# Patient Record
Sex: MALE | Race: BLACK OR AFRICAN AMERICAN | NOT HISPANIC OR LATINO | URBAN - METROPOLITAN AREA
[De-identification: names, ages, dates, MRNs, and addresses within clinical notes are randomized per-mention and may not be internally consistent; named-entity substitution may affect disease eponyms.]

---

## 2021-10-27 RX ORDER — POVIDONE-IODINE 5 %
1 AEROSOL (ML) TOPICAL ONCE
Refills: 0 | Status: COMPLETED | OUTPATIENT
Start: 2021-10-28 | End: 2021-10-28

## 2021-10-27 NOTE — ASU PATIENT PROFILE, ADULT - NSICDXPASTSURGICALHX_GEN_ALL_CORE_FT
PAST SURGICAL HISTORY:  No significant past surgical history PAST SURGICAL HISTORY:  S/P appendectomy
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
## 2021-10-27 NOTE — H&P ADULT - NSHPLABSRESULTS_GEN_ALL_CORE
Preop CBC, BMP, PT/PTT/INR, UA - WNL per medical clearance  Cr .97  Spirometry WNL  Preop CXR - WNL per medical clearance   Preop EKG - sinus - WNL per medical clearance   3M DOS  Covid swab _____ Preop CBC, BMP, PT/PTT/INR, UA - WNL per medical clearance  Cr 0.97  Spirometry WNL  Preop CXR - WNL per medical clearance   Preop EKG - sinus - WNL per medical clearance   3M DOS  Covid swab neg 10/26

## 2021-10-27 NOTE — H&P ADULT - PROBLEM SELECTOR PLAN 1
Admit to Orthopaedic Service.  Presents today for elective anterior cervical discectomy with fusion C6-C7, possible C5-C6  Pt medically stable and cleared for procedure today by Dr. Enamorado

## 2021-10-27 NOTE — H&P ADULT - NSHPPHYSICALEXAM_GEN_ALL_CORE
MSK: + decreased ROM 2/2 pain, cervical spine       Remainder of exam per medical clearance note NAD, sitting comfortably in chair  MSK: Decreased ROM of cervical spine secondary to pain, sensation intact to light touch in bilateral upper extremities, radial pulse 2+ bilaterally, 4/5  BUE, 4/5 triceps LUE, 5/5 biceps/triceps RUE, 5/5 biceps LUE    Rest of PE per medical clearance

## 2021-10-28 ENCOUNTER — INPATIENT (INPATIENT)
Facility: HOSPITAL | Age: 46
LOS: 2 days | Discharge: HOME | DRG: 473 | End: 2021-10-31
Attending: ORTHOPAEDIC SURGERY | Admitting: ORTHOPAEDIC SURGERY
Payer: COMMERCIAL

## 2021-10-28 VITALS
TEMPERATURE: 97 F | HEIGHT: 69 IN | WEIGHT: 182.98 LBS | HEART RATE: 92 BPM | SYSTOLIC BLOOD PRESSURE: 119 MMHG | DIASTOLIC BLOOD PRESSURE: 79 MMHG | RESPIRATION RATE: 16 BRPM | OXYGEN SATURATION: 96 %

## 2021-10-28 DIAGNOSIS — M50.122 CERVICAL DISC DISORDER AT C5-C6 LEVEL WITH RADICULOPATHY: ICD-10-CM

## 2021-10-28 DIAGNOSIS — Z90.49 ACQUIRED ABSENCE OF OTHER SPECIFIED PARTS OF DIGESTIVE TRACT: Chronic | ICD-10-CM

## 2021-10-28 DIAGNOSIS — M54.12 RADICULOPATHY, CERVICAL REGION: ICD-10-CM

## 2021-10-28 DIAGNOSIS — M48.02 SPINAL STENOSIS, CERVICAL REGION: ICD-10-CM

## 2021-10-28 RX ORDER — GABAPENTIN 400 MG/1
300 CAPSULE ORAL ONCE
Refills: 0 | Status: COMPLETED | OUTPATIENT
Start: 2021-10-28 | End: 2021-10-28

## 2021-10-28 RX ORDER — INFLUENZA VIRUS VACCINE 15; 15; 15; 15 UG/.5ML; UG/.5ML; UG/.5ML; UG/.5ML
0.5 SUSPENSION INTRAMUSCULAR ONCE
Refills: 0 | Status: DISCONTINUED | OUTPATIENT
Start: 2021-10-28 | End: 2021-10-31

## 2021-10-28 RX ORDER — BENZOCAINE AND MENTHOL 5; 1 G/100ML; G/100ML
1 LIQUID ORAL
Refills: 0 | Status: DISCONTINUED | OUTPATIENT
Start: 2021-10-28 | End: 2021-10-28

## 2021-10-28 RX ORDER — ONDANSETRON 8 MG/1
4 TABLET, FILM COATED ORAL EVERY 6 HOURS
Refills: 0 | Status: DISCONTINUED | OUTPATIENT
Start: 2021-10-28 | End: 2021-10-31

## 2021-10-28 RX ORDER — BENZOCAINE AND MENTHOL 5; 1 G/100ML; G/100ML
1 LIQUID ORAL
Refills: 0 | Status: DISCONTINUED | OUTPATIENT
Start: 2021-10-28 | End: 2021-10-31

## 2021-10-28 RX ORDER — HYDROMORPHONE HYDROCHLORIDE 2 MG/ML
0.5 INJECTION INTRAMUSCULAR; INTRAVENOUS; SUBCUTANEOUS
Refills: 0 | Status: DISCONTINUED | OUTPATIENT
Start: 2021-10-28 | End: 2021-10-31

## 2021-10-28 RX ORDER — CEFAZOLIN SODIUM 1 G
2000 VIAL (EA) INJECTION EVERY 8 HOURS
Refills: 0 | Status: COMPLETED | OUTPATIENT
Start: 2021-10-28 | End: 2021-10-29

## 2021-10-28 RX ORDER — METOCLOPRAMIDE HCL 10 MG
10 TABLET ORAL ONCE
Refills: 0 | Status: DISCONTINUED | OUTPATIENT
Start: 2021-10-28 | End: 2021-10-31

## 2021-10-28 RX ORDER — CHLORHEXIDINE GLUCONATE 213 G/1000ML
1 SOLUTION TOPICAL ONCE
Refills: 0 | Status: COMPLETED | OUTPATIENT
Start: 2021-10-28 | End: 2021-10-28

## 2021-10-28 RX ORDER — ACETAMINOPHEN 500 MG
1000 TABLET ORAL ONCE
Refills: 0 | Status: COMPLETED | OUTPATIENT
Start: 2021-10-28 | End: 2021-10-28

## 2021-10-28 RX ORDER — OXYCODONE HYDROCHLORIDE 5 MG/1
5 TABLET ORAL EVERY 4 HOURS
Refills: 0 | Status: DISCONTINUED | OUTPATIENT
Start: 2021-10-28 | End: 2021-10-29

## 2021-10-28 RX ORDER — OXYCODONE HYDROCHLORIDE 5 MG/1
10 TABLET ORAL EVERY 4 HOURS
Refills: 0 | Status: DISCONTINUED | OUTPATIENT
Start: 2021-10-28 | End: 2021-10-29

## 2021-10-28 RX ORDER — APREPITANT 80 MG/1
40 CAPSULE ORAL ONCE
Refills: 0 | Status: COMPLETED | OUTPATIENT
Start: 2021-10-28 | End: 2021-10-28

## 2021-10-28 RX ORDER — SODIUM CHLORIDE 9 MG/ML
1000 INJECTION, SOLUTION INTRAVENOUS
Refills: 0 | Status: DISCONTINUED | OUTPATIENT
Start: 2021-10-28 | End: 2021-10-31

## 2021-10-28 RX ORDER — DEXAMETHASONE 0.5 MG/5ML
10 ELIXIR ORAL EVERY 8 HOURS
Refills: 0 | Status: COMPLETED | OUTPATIENT
Start: 2021-10-28 | End: 2021-10-29

## 2021-10-28 RX ORDER — MAGNESIUM HYDROXIDE 400 MG/1
30 TABLET, CHEWABLE ORAL EVERY 12 HOURS
Refills: 0 | Status: DISCONTINUED | OUTPATIENT
Start: 2021-10-28 | End: 2021-10-31

## 2021-10-28 RX ORDER — ACETAMINOPHEN 500 MG
975 TABLET ORAL EVERY 8 HOURS
Refills: 0 | Status: DISCONTINUED | OUTPATIENT
Start: 2021-10-28 | End: 2021-10-31

## 2021-10-28 RX ORDER — SENNA PLUS 8.6 MG/1
2 TABLET ORAL AT BEDTIME
Refills: 0 | Status: DISCONTINUED | OUTPATIENT
Start: 2021-10-28 | End: 2021-10-31

## 2021-10-28 RX ORDER — CYCLOBENZAPRINE HYDROCHLORIDE 10 MG/1
5 TABLET, FILM COATED ORAL THREE TIMES A DAY
Refills: 0 | Status: DISCONTINUED | OUTPATIENT
Start: 2021-10-28 | End: 2021-10-31

## 2021-10-28 RX ADMIN — Medication 1000 MILLIGRAM(S): at 09:04

## 2021-10-28 RX ADMIN — Medication 1 APPLICATION(S): at 08:49

## 2021-10-28 RX ADMIN — CYCLOBENZAPRINE HYDROCHLORIDE 5 MILLIGRAM(S): 10 TABLET, FILM COATED ORAL at 18:59

## 2021-10-28 RX ADMIN — Medication 102 MILLIGRAM(S): at 17:21

## 2021-10-28 RX ADMIN — Medication 975 MILLIGRAM(S): at 17:20

## 2021-10-28 RX ADMIN — Medication 975 MILLIGRAM(S): at 18:00

## 2021-10-28 RX ADMIN — GABAPENTIN 300 MILLIGRAM(S): 400 CAPSULE ORAL at 08:51

## 2021-10-28 RX ADMIN — Medication 1000 MILLIGRAM(S): at 08:51

## 2021-10-28 RX ADMIN — OXYCODONE HYDROCHLORIDE 10 MILLIGRAM(S): 5 TABLET ORAL at 23:30

## 2021-10-28 RX ADMIN — OXYCODONE HYDROCHLORIDE 10 MILLIGRAM(S): 5 TABLET ORAL at 22:33

## 2021-10-28 RX ADMIN — BENZOCAINE AND MENTHOL 1 LOZENGE: 5; 1 LIQUID ORAL at 22:33

## 2021-10-28 RX ADMIN — APREPITANT 40 MILLIGRAM(S): 80 CAPSULE ORAL at 08:51

## 2021-10-28 RX ADMIN — Medication 100 MILLIGRAM(S): at 18:59

## 2021-10-28 RX ADMIN — CHLORHEXIDINE GLUCONATE 1 APPLICATION(S): 213 SOLUTION TOPICAL at 08:49
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
## 2021-10-28 NOTE — CONSULT NOTE ADULT - ATTENDING COMMENTS
ASSESSMENT: This is a 46y old Male with no PMH scheduled for ACDF C6-7 possible C5-7 with Dr. Cerrato.     Recommended Treatment PLAN:  1. Oxycodone 5-10 mg PO Q4h PRN moderate-severe pain  2. Dilaudid 0.5 mg IVP Q2h PRN breakthrough pain  3. Tylenol 975 mg PO TID  4. Flexeril 5 mg PO TID  Pt seen and examined by me, NP acted as scribe
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
## 2021-10-29 LAB
ANION GAP SERPL CALC-SCNC: 11 MMOL/L — SIGNIFICANT CHANGE UP (ref 5–17)
ANISOCYTOSIS BLD QL: SLIGHT — SIGNIFICANT CHANGE UP
BASOPHILS # BLD AUTO: 0 K/UL — SIGNIFICANT CHANGE UP (ref 0–0.2)
BASOPHILS NFR BLD AUTO: 0 % — SIGNIFICANT CHANGE UP (ref 0–2)
BUN SERPL-MCNC: 18 MG/DL — SIGNIFICANT CHANGE UP (ref 7–23)
CALCIUM SERPL-MCNC: 9.2 MG/DL — SIGNIFICANT CHANGE UP (ref 8.4–10.5)
CHLORIDE SERPL-SCNC: 104 MMOL/L — SIGNIFICANT CHANGE UP (ref 96–108)
CO2 SERPL-SCNC: 22 MMOL/L — SIGNIFICANT CHANGE UP (ref 22–31)
CREAT SERPL-MCNC: 0.81 MG/DL — SIGNIFICANT CHANGE UP (ref 0.5–1.3)
EOSINOPHIL # BLD AUTO: 0 K/UL — SIGNIFICANT CHANGE UP (ref 0–0.5)
EOSINOPHIL NFR BLD AUTO: 0 % — SIGNIFICANT CHANGE UP (ref 0–6)
GIANT PLATELETS BLD QL SMEAR: PRESENT — SIGNIFICANT CHANGE UP
GLUCOSE SERPL-MCNC: 165 MG/DL — HIGH (ref 70–99)
HCT VFR BLD CALC: 43.6 % — SIGNIFICANT CHANGE UP (ref 39–50)
HGB BLD-MCNC: 14 G/DL — SIGNIFICANT CHANGE UP (ref 13–17)
LYMPHOCYTES # BLD AUTO: 1.16 K/UL — SIGNIFICANT CHANGE UP (ref 1–3.3)
LYMPHOCYTES # BLD AUTO: 6.2 % — LOW (ref 13–44)
MACROCYTES BLD QL: SLIGHT — SIGNIFICANT CHANGE UP
MANUAL SMEAR VERIFICATION: SIGNIFICANT CHANGE UP
MCHC RBC-ENTMCNC: 27.7 PG — SIGNIFICANT CHANGE UP (ref 27–34)
MCHC RBC-ENTMCNC: 32.1 GM/DL — SIGNIFICANT CHANGE UP (ref 32–36)
MCV RBC AUTO: 86.3 FL — SIGNIFICANT CHANGE UP (ref 80–100)
MICROCYTES BLD QL: SLIGHT — SIGNIFICANT CHANGE UP
MONOCYTES # BLD AUTO: 0.17 K/UL — SIGNIFICANT CHANGE UP (ref 0–0.9)
MONOCYTES NFR BLD AUTO: 0.9 % — LOW (ref 2–14)
NEUTROPHILS # BLD AUTO: 17.38 K/UL — HIGH (ref 1.8–7.4)
NEUTROPHILS NFR BLD AUTO: 92.9 % — HIGH (ref 43–77)
OVALOCYTES BLD QL SMEAR: SLIGHT — SIGNIFICANT CHANGE UP
PLAT MORPH BLD: ABNORMAL
PLATELET # BLD AUTO: 275 K/UL — SIGNIFICANT CHANGE UP (ref 150–400)
POIKILOCYTOSIS BLD QL AUTO: SIGNIFICANT CHANGE UP
POLYCHROMASIA BLD QL SMEAR: SLIGHT — SIGNIFICANT CHANGE UP
POTASSIUM SERPL-MCNC: 4.4 MMOL/L — SIGNIFICANT CHANGE UP (ref 3.5–5.3)
POTASSIUM SERPL-SCNC: 4.4 MMOL/L — SIGNIFICANT CHANGE UP (ref 3.5–5.3)
RBC # BLD: 5.05 M/UL — SIGNIFICANT CHANGE UP (ref 4.2–5.8)
RBC # FLD: 13.6 % — SIGNIFICANT CHANGE UP (ref 10.3–14.5)
RBC BLD AUTO: ABNORMAL
ROULEAUX BLD QL SMEAR: PRESENT
SODIUM SERPL-SCNC: 137 MMOL/L — SIGNIFICANT CHANGE UP (ref 135–145)
SPHEROCYTES BLD QL SMEAR: SLIGHT — SIGNIFICANT CHANGE UP
WBC # BLD: 18.71 K/UL — HIGH (ref 3.8–10.5)
WBC # FLD AUTO: 18.71 K/UL — HIGH (ref 3.8–10.5)

## 2021-10-29 PROCEDURE — 72040 X-RAY EXAM NECK SPINE 2-3 VW: CPT | Mod: 26

## 2021-10-29 RX ORDER — OXYCODONE HYDROCHLORIDE 5 MG/1
10 TABLET ORAL
Refills: 0 | Status: DISCONTINUED | OUTPATIENT
Start: 2021-10-29 | End: 2021-10-31

## 2021-10-29 RX ORDER — ACETAMINOPHEN 500 MG
3 TABLET ORAL
Qty: 0 | Refills: 0 | DISCHARGE
Start: 2021-10-29

## 2021-10-29 RX ORDER — CYCLOBENZAPRINE HYDROCHLORIDE 10 MG/1
1 TABLET, FILM COATED ORAL
Qty: 21 | Refills: 0
Start: 2021-10-29 | End: 2021-11-04

## 2021-10-29 RX ORDER — OXYCODONE HYDROCHLORIDE 5 MG/1
5 TABLET ORAL
Refills: 0 | Status: DISCONTINUED | OUTPATIENT
Start: 2021-10-29 | End: 2021-10-31

## 2021-10-29 RX ORDER — OXYCODONE HYDROCHLORIDE 5 MG/1
1 TABLET ORAL
Qty: 40 | Refills: 0
Start: 2021-10-29 | End: 2021-11-04

## 2021-10-29 RX ADMIN — Medication 975 MILLIGRAM(S): at 22:00

## 2021-10-29 RX ADMIN — SODIUM CHLORIDE 100 MILLILITER(S): 9 INJECTION, SOLUTION INTRAVENOUS at 17:00

## 2021-10-29 RX ADMIN — BENZOCAINE AND MENTHOL 1 LOZENGE: 5; 1 LIQUID ORAL at 12:34

## 2021-10-29 RX ADMIN — Medication 975 MILLIGRAM(S): at 14:10

## 2021-10-29 RX ADMIN — Medication 975 MILLIGRAM(S): at 09:26

## 2021-10-29 RX ADMIN — Medication 100 MILLIGRAM(S): at 02:03

## 2021-10-29 RX ADMIN — SODIUM CHLORIDE 100 MILLILITER(S): 9 INJECTION, SOLUTION INTRAVENOUS at 06:11

## 2021-10-29 RX ADMIN — CYCLOBENZAPRINE HYDROCHLORIDE 5 MILLIGRAM(S): 10 TABLET, FILM COATED ORAL at 06:11

## 2021-10-29 RX ADMIN — CYCLOBENZAPRINE HYDROCHLORIDE 5 MILLIGRAM(S): 10 TABLET, FILM COATED ORAL at 18:38

## 2021-10-29 RX ADMIN — Medication 975 MILLIGRAM(S): at 15:10

## 2021-10-29 RX ADMIN — OXYCODONE HYDROCHLORIDE 10 MILLIGRAM(S): 5 TABLET ORAL at 17:01

## 2021-10-29 RX ADMIN — Medication 975 MILLIGRAM(S): at 10:26

## 2021-10-29 RX ADMIN — OXYCODONE HYDROCHLORIDE 10 MILLIGRAM(S): 5 TABLET ORAL at 04:54

## 2021-10-29 RX ADMIN — Medication 102 MILLIGRAM(S): at 01:48

## 2021-10-29 RX ADMIN — SENNA PLUS 2 TABLET(S): 8.6 TABLET ORAL at 21:14

## 2021-10-29 RX ADMIN — OXYCODONE HYDROCHLORIDE 10 MILLIGRAM(S): 5 TABLET ORAL at 05:40

## 2021-10-29 RX ADMIN — OXYCODONE HYDROCHLORIDE 10 MILLIGRAM(S): 5 TABLET ORAL at 11:49

## 2021-10-29 RX ADMIN — Medication 102 MILLIGRAM(S): at 09:25

## 2021-10-29 RX ADMIN — BENZOCAINE AND MENTHOL 1 LOZENGE: 5; 1 LIQUID ORAL at 04:54

## 2021-10-29 RX ADMIN — Medication 975 MILLIGRAM(S): at 21:13

## 2021-10-29 RX ADMIN — OXYCODONE HYDROCHLORIDE 10 MILLIGRAM(S): 5 TABLET ORAL at 18:00

## 2021-10-29 RX ADMIN — OXYCODONE HYDROCHLORIDE 10 MILLIGRAM(S): 5 TABLET ORAL at 12:49

## 2021-10-29 NOTE — DISCHARGE NOTE PROVIDER - CARE PROVIDER_API CALL
Otoniel Cerrato (DO)  Orthopaedic Surgery  [address] [place], [place] [address]  Phone: [phone]  Fax: [phone]  Follow Up Time:

## 2021-10-29 NOTE — OCCUPATIONAL THERAPY INITIAL EVALUATION ADULT - DIAGNOSIS, OT EVAL
Pt presents with +dizziness (vital signs stable), unsteady gait during functional mob, decreased BUE strength, decreased sensation on L foot, and RUE ~70% diminished affecting ability to complete ADL, functional mobility and transfers.

## 2021-10-29 NOTE — DISCHARGE NOTE PROVIDER - NSDCCPTREATMENT_GEN_ALL_CORE_FT
PRINCIPAL PROCEDURE  Procedure: Anterior cervical discectomy with fusion  Findings and Treatment: C6-C7
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
## 2021-10-29 NOTE — DISCHARGE NOTE PROVIDER - HOSPITAL COURSE
Admit to Orthopaedics ACDF C5-C7  Perioperative Antibiotics  DVT prophylaxis  Physical Therapy  Pain Management

## 2021-10-29 NOTE — PHYSICAL THERAPY INITIAL EVALUATION ADULT - GENERAL OBSERVATIONS, REHAB EVAL
As per RN Eena patient cleared for PT/OOB. Received supine + telemetry, SCDs, IV, gauze ant neck C,D,I, in NAD

## 2021-10-29 NOTE — PHYSICAL THERAPY INITIAL EVALUATION ADULT - ADDITIONAL COMMENTS
Lives with wife in a private house with 7 [address] and 17 steps in the home.Independent PTA. Denies use of AD

## 2021-10-29 NOTE — OCCUPATIONAL THERAPY INITIAL EVALUATION ADULT - NS ASR FOLLOW COMMAND OT EVAL
Family and patient have been updated on plan of care. No needs or questions at this time.    100% of the time/able to follow multistep instructions

## 2021-10-29 NOTE — OCCUPATIONAL THERAPY INITIAL EVALUATION ADULT - RANGE OF MOTION EXAMINATION
decreased b/l hip internal rotation ~15 degrees; R knee ROM limited 2/2 to pain/deficits as listed below
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
## 2021-10-29 NOTE — DISCHARGE NOTE PROVIDER - NSDCFUADDINST_GEN_ALL_CORE_FT
No strenuous activity (bending/twisting), heavy lifting, driving or returning to work until cleared by MD.  Wear your miami J hard collar as directed   You may shower. Remove dressing daily before shower, pat the area dry gently then reapply dry gauze dressing x 5 days, then leave incision open to air.   Try to have regular bowel movements, take stool softener or laxative if necessary.  May take pepcid or zantac for upset stomach.  Ice affected areas to decrease swelling.  Call to schedule an appt with Dr. Cerrato for follow up, if you have staples or sutures they will be removed in office.  Contact your doctor if you experience: fever greater than 101.5, chills, chest pain, difficulty breathing, redness or excessive drainage around the incision, other concerns.

## 2021-10-29 NOTE — OCCUPATIONAL THERAPY INITIAL EVALUATION ADULT - GENERAL OBSERVATIONS, REHAB EVAL
Pt received semi-supine in bed eating breakfast, A&Ox4, anterior cervical dressing C/D/I, cervical collar (donned prior to OOB activity), +hemovac x1, +tele, +room air, in 9/10 anterior and posterior neck pain, presenting fatigued (eyes closed majority of session), agreeable to OT session.

## 2021-10-30 VITALS
SYSTOLIC BLOOD PRESSURE: 102 MMHG | OXYGEN SATURATION: 96 % | HEART RATE: 75 BPM | TEMPERATURE: 98 F | RESPIRATION RATE: 19 BRPM | DIASTOLIC BLOOD PRESSURE: 63 MMHG

## 2021-10-30 LAB
ANION GAP SERPL CALC-SCNC: 6 MMOL/L — SIGNIFICANT CHANGE UP (ref 5–17)
BASOPHILS # BLD AUTO: 0.03 K/UL — SIGNIFICANT CHANGE UP (ref 0–0.2)
BASOPHILS NFR BLD AUTO: 0.1 % — SIGNIFICANT CHANGE UP (ref 0–2)
BUN SERPL-MCNC: 16 MG/DL — SIGNIFICANT CHANGE UP (ref 7–23)
CALCIUM SERPL-MCNC: 8.9 MG/DL — SIGNIFICANT CHANGE UP (ref 8.4–10.5)
CHLORIDE SERPL-SCNC: 102 MMOL/L — SIGNIFICANT CHANGE UP (ref 96–108)
CO2 SERPL-SCNC: 29 MMOL/L — SIGNIFICANT CHANGE UP (ref 22–31)
CREAT SERPL-MCNC: 0.88 MG/DL — SIGNIFICANT CHANGE UP (ref 0.5–1.3)
EOSINOPHIL # BLD AUTO: 0 K/UL — SIGNIFICANT CHANGE UP (ref 0–0.5)
EOSINOPHIL NFR BLD AUTO: 0 % — SIGNIFICANT CHANGE UP (ref 0–6)
GLUCOSE SERPL-MCNC: 116 MG/DL — HIGH (ref 70–99)
HCT VFR BLD CALC: 38.7 % — LOW (ref 39–50)
HGB BLD-MCNC: 12.7 G/DL — LOW (ref 13–17)
IMM GRANULOCYTES NFR BLD AUTO: 0.6 % — SIGNIFICANT CHANGE UP (ref 0–1.5)
LYMPHOCYTES # BLD AUTO: 11.6 % — LOW (ref 13–44)
LYMPHOCYTES # BLD AUTO: 2.37 K/UL — SIGNIFICANT CHANGE UP (ref 1–3.3)
MCHC RBC-ENTMCNC: 28.2 PG — SIGNIFICANT CHANGE UP (ref 27–34)
MCHC RBC-ENTMCNC: 32.8 GM/DL — SIGNIFICANT CHANGE UP (ref 32–36)
MCV RBC AUTO: 86 FL — SIGNIFICANT CHANGE UP (ref 80–100)
MONOCYTES # BLD AUTO: 1.37 K/UL — HIGH (ref 0–0.9)
MONOCYTES NFR BLD AUTO: 6.7 % — SIGNIFICANT CHANGE UP (ref 2–14)
NEUTROPHILS # BLD AUTO: 16.52 K/UL — HIGH (ref 1.8–7.4)
NEUTROPHILS NFR BLD AUTO: 81 % — HIGH (ref 43–77)
NRBC # BLD: 0 /100 WBCS — SIGNIFICANT CHANGE UP (ref 0–0)
PLATELET # BLD AUTO: 244 K/UL — SIGNIFICANT CHANGE UP (ref 150–400)
POTASSIUM SERPL-MCNC: 4 MMOL/L — SIGNIFICANT CHANGE UP (ref 3.5–5.3)
POTASSIUM SERPL-SCNC: 4 MMOL/L — SIGNIFICANT CHANGE UP (ref 3.5–5.3)
RBC # BLD: 4.5 M/UL — SIGNIFICANT CHANGE UP (ref 4.2–5.8)
RBC # FLD: 14 % — SIGNIFICANT CHANGE UP (ref 10.3–14.5)
SODIUM SERPL-SCNC: 137 MMOL/L — SIGNIFICANT CHANGE UP (ref 135–145)
WBC # BLD: 20.41 K/UL — HIGH (ref 3.8–10.5)
WBC # FLD AUTO: 20.41 K/UL — HIGH (ref 3.8–10.5)

## 2021-10-30 PROCEDURE — 73560 X-RAY EXAM OF KNEE 1 OR 2: CPT | Mod: 26,RT

## 2021-10-30 RX ORDER — OXYCODONE HYDROCHLORIDE 5 MG/1
1 TABLET ORAL
Qty: 40 | Refills: 0
Start: 2021-10-30 | End: 2021-11-05

## 2021-10-30 RX ORDER — CYCLOBENZAPRINE HYDROCHLORIDE 10 MG/1
1 TABLET, FILM COATED ORAL
Qty: 21 | Refills: 0
Start: 2021-10-30 | End: 2021-11-05

## 2021-10-30 RX ADMIN — CYCLOBENZAPRINE HYDROCHLORIDE 5 MILLIGRAM(S): 10 TABLET, FILM COATED ORAL at 17:22

## 2021-10-30 RX ADMIN — OXYCODONE HYDROCHLORIDE 10 MILLIGRAM(S): 5 TABLET ORAL at 17:52

## 2021-10-30 RX ADMIN — BENZOCAINE AND MENTHOL 1 LOZENGE: 5; 1 LIQUID ORAL at 05:56

## 2021-10-30 RX ADMIN — OXYCODONE HYDROCHLORIDE 10 MILLIGRAM(S): 5 TABLET ORAL at 12:36

## 2021-10-30 RX ADMIN — CYCLOBENZAPRINE HYDROCHLORIDE 5 MILLIGRAM(S): 10 TABLET, FILM COATED ORAL at 05:55

## 2021-10-30 RX ADMIN — OXYCODONE HYDROCHLORIDE 10 MILLIGRAM(S): 5 TABLET ORAL at 16:53

## 2021-10-30 RX ADMIN — OXYCODONE HYDROCHLORIDE 10 MILLIGRAM(S): 5 TABLET ORAL at 11:49

## 2021-10-30 NOTE — PROGRESS NOTE ADULT - SUBJECTIVE AND OBJECTIVE BOX
Vital Signs Last 24 Hrs  T(C): 36.8 (30 Oct 2021 17:00), Max: 36.8 (30 Oct 2021 09:36)  T(F): 98.2 (30 Oct 2021 17:00), Max: 98.2 (30 Oct 2021 09:36)  HR: 75 (30 Oct 2021 17:00) (73 - 88)  BP: 102/63 (30 Oct 2021 17:00) (102/63 - 116/68)  BP(mean): --  RR: 19 (30 Oct 2021 17:00) (18 - 19)  SpO2: 96% (30 Oct 2021 17:00) (96% - 97%)POD nu 2    Procedure: ACDF C6-C7  Surgeon: Dr Cerrato     Pt comfortable, without complaints. Pain well controlled in PACU, pt comfortable.   Denies CP, SOB, N/V, numbness/tingling           General: Pt Alert and oriented, comfortable  Dressing C/D/I anterior neck   Sensation intact and equal BUE   Motor ain/pin/u nerves intact BUE,  strength diminished bilaterally as was preoperatively as well   Pulses radial pulse palpable   COR RRR  Lung clear     A/P: 46yMale POD# 1s/p ACDF C6-C7                         14.0   18.71 )-----------( 275      ( 29 Oct 2021 08:14 )             43.6   10-29    137  |  104  |  18  ----------------------------<  165<H>  4.4   |  22  |  0.81    Ca    9.2      29 Oct 2021 08:14                          12.7   20.41 )-----------( 244      ( 30 Oct 2021 08:31 )             38.7     
  Pain Management Progress Note - [facility] [phone]    HPI: Patient seen and examined today. Patient POD 1 s/p ACDF C5-7. Patient reports endorsing neck and throat pain rated a 9 out of 10. Patient reports Oxycodone helps to relieve the pain, but doesn't last long enough. Patient reports endorsing some dizziness earlier this AM, not present at this time. Denies sob/cp,/n/v, /numbness or tingling. Patient denies other side effects from current pain regimen.    Pertinent PMH: Pain at: ___Back __X_Neck___Knee ___Hip ___Shoulder ___ Opioid tolerance    Pain is __X_ sharp ____dull ___burning ___achy ___ Intensity: ____ mild ___X_mod ___X_severe     Location __X___surgical site __X___cervical _____lumbar ____abd _____upper ext____lower ext    Worse with __X__activity __X__movement _____physical therapy___ Rest    Improved with __X__medication _X___rest ____physical therapy    PMH:  oxyCODONE    IR  oxyCODONE    IR  influenza   Vaccine  benzocaine 15 mG/menthol 3.6 mG Lozenge  benzocaine 15 mG/menthol 3.6 mG (Sugar-Free) Lozenge  cyclobenzaprine  acetaminophen     Tablet ..  HYDROmorphone  Injectable  oxyCODONE    IR  oxyCODONE    IR  senna  magnesium hydroxide Suspension  ondansetron Injectable  metoclopramide Injectable  dexAMETHasone  IVPB  ceFAZolin   IVPB  HYDROmorphone  Injectable  lactated ringers.  gabapentin  aprepitant  acetaminophen     Tablet ..  chlorhexidine 2% Cloths  povidone iodine 5% Nasal Swab      ROS: Const:  _N__febrile   Eyes:___ENT:___CV: _N__chest pain  Resp: _N___sob  GI:__N_nausea __N_vomiting __N__abd pain ___npo ___clears ___full diet __bm  :___ Musk: __Y_pain N___spasm  Skin:___ Neuro:  ___Nsedation___confusion_N___ numbness ___weakness __N_paresthesia  Psych:___anxiety  Endo:___ Heme:___Allergy:__NKDA_      10-29 @ 08:00334 mL/min/1.73M2  Hemoglobin: 14.0 g/dL (10-29 @ 08:14)  T(C): 36.8 (10-29-21 @ 08:40), Max: 37.1 (10-28-21 @ 13:17)  HR: 72 (10-29-21 @ 08:40) (54 - 96)  BP: 133/76 (10-29-21 @ 10:20) (97/50 - 140/63)  RR: 16 (10-29-21 @ 08:40) (12 - 20)  SpO2: 97% (10-29-21 @ 08:40) (97% - 100%)  Wt(kg): --     PHYSICAL EXAM:  Gen Appearance: __X_no acute distress _X__appropriate       Neuro: ___SILT feet____ EOM Intact Psych: AAOX_3_, _X__mood/affect appropriate        Eyes: __X_conjunctiva WNL  __X___ Pupils equal and round        ENT: _X__ears and nose atraumatic__X_ Hearing grossly intact        Neck: _X__trachea midline, no visible masses ___thyroid without palpable mass    Resp: _X__Nml WOB____No tactile fremitus ___clear to auscultation    Cardio: __X_extremities free from edema ____pedal pulses palpable    GI/Abdomen: ___soft _____ Nontender_____X_Nondistended_____HSM    Lymphatic: ___no palpable nodes in neck  ___no palpable nodes calves and feet    Skin/Wound: ___Incision, _X__Dressing c/d/i,   ____surrounding tissues soft,  _X__drain/chest tube present____    Muscular: EHL 5___/5  Gastrocnemius__5_/5    _X__absent clubbing/cyanosis         ASSESSMENT:  This is a 46y old Male with no PMH POD 1 s/p ACDF C6-7 possible C5-7 with Dr. Cerrato, with neck pain.     Recommended Treatment PLAN:  1. Increase to Oxycodone 5-10 mg PO Q3h PRN moderate-severe pain  2. Dilaudid 0.5 mg IVP Q2h PRN breakthrough pain  3. Tylenol 975 mg PO TID  4. Flexeril 5 mg PO TID  Plan discussed with Dr. Zarate   
Orthopaedic Surgery Progress Note    Patient seen and examined. KENNEDY. Patient without complaints. Pain controlled. Denies CP, SOB, N/V, tactile fevers, calf pain.  Pt is POD #1 s/p ACDF C5-C7  HV d/c'd this AM      Vital Signs Last 24 Hrs  T(C): 36.8 (29 Oct 2021 08:40), Max: 36.9 (29 Oct 2021 04:45)  T(F): 98.2 (29 Oct 2021 08:40), Max: 98.4 (29 Oct 2021 04:45)  HR: 72 (29 Oct 2021 08:40) (54 - 96)  BP: 133/76 (29 Oct 2021 10:20) (97/50 - 140/63)  BP(mean): 77 (28 Oct 2021 19:31) (76 - 96)  RR: 16 (29 Oct 2021 08:40) (14 - 20)  SpO2: 97% (29 Oct 2021 08:40) (97% - 100%)         CAPILLARY BLOOD GLUCOSE                      Physical Exam:  Pt laying comfortably in bed, NAD.  Skin warm and well perfused, no visible erythema/ecchymoses.  Dressing C/D/I, Troy J collar in place  Sensation intact to bilateral UE distally. Motor Strength 5/5 to /interossei bilaterally.   AIN/PIN intact.   Brisk capillary refill distal UE bilaterally     LABS                        14.0   18.71 )-----------( 275      ( 29 Oct 2021 08:14 )             43.6                                10-29    137  |  104  |  18  ----------------------------<  165<H>  4.4   |  22  |  0.81    Ca    9.2      29 Oct 2021 08:14        Post op XRs completed    A/P: 46M POD #1 s/p ACDF C5-C7    CONTINUE:        1. PT: WBAT   2. DVT prophylaxis: SCDs  3. Pain Control as needed   4. Dispo: Home pending PT clearance       
Orthopedics Post Op Check    Procedure: ACDF C6-C7  Surgeon: Dr Cerrato     Pt comfortable, without complaints. Pain well controlled in PACU, pt comfortable.   Denies CP, SOB, N/V, numbness/tingling     Vital Signs Last 24 Hrs  T(C): 37.1 (28 Oct 2021 13:17), Max: 37.1 (28 Oct 2021 13:17)  T(F): 98.8 (28 Oct 2021 13:17), Max: 98.8 (28 Oct 2021 13:17)  HR: 82 (28 Oct 2021 15:26) (54 - 92)  BP: 124/76 (28 Oct 2021 15:26) (117/60 - 136/64)  BP(mean): 96 (28 Oct 2021 15:26) (85 - 96)  RR: 18 (28 Oct 2021 15:26) (12 - 19)  SpO2: 100% (28 Oct 2021 15:26) (96% - 100%)  AVSS, NAD      General: Pt Alert and oriented, comfortable  Dressing C/D/I anterior neck   Sensation intact and equal BUE   Motor ain/pin/u nerves intact BUE,  strength diminished bilaterally as was preoperatively as well   Pulses radial pulse palpable     A/P: 46yMale POD#0 s/p ACDF C6-C7   - Stable  - Pain Control  - DVT ppx: SCDs  - Angely-op abx: Ancef   - PT, WBS: WBAT   - F/U AM Labs
pt seen and examined nad avss    pe dressing cdi nvi   power 5/5   sensation grossly intact   no calf tenderness    imp sp psf    plan   ambulation  pt  pain control  scd  dc to home after clear pt   
pt seen and examined nad avss    pe dressing cdi nvi   power 5/5   sensation grossly intact   no calf tenderness    imp sp psf    plan   ambulation  pt  pain control  scd  dc to home after clear pt       
POD nu 1    Procedure: ACDF C6-C7  Surgeon: Dr Cerrato     Pt comfortable, without complaints. Pain well controlled in PACU, pt comfortable.   Denies CP, SOB, N/V, numbness/tingling     Vital Signs Last 24 Hrs  T(C): 36.8 (29 Oct 2021 08:40), Max: 37.1 (28 Oct 2021 13:17)  T(F): 98.2 (29 Oct 2021 08:40), Max: 98.8 (28 Oct 2021 13:17)  HR: 72 (29 Oct 2021 08:40) (54 - 96)  BP: 116/56 (29 Oct 2021 08:40) (97/50 - 140/63)  BP(mean): 77 (28 Oct 2021 19:31) (76 - 96)  RR: 16 (29 Oct 2021 08:40) (12 - 20)  SpO2: 97% (29 Oct 2021 08:40) (97% - 100%)      General: Pt Alert and oriented, comfortable  Dressing C/D/I anterior neck   Sensation intact and equal BUE   Motor ain/pin/u nerves intact BUE,  strength diminished bilaterally as was preoperatively as well   Pulses radial pulse palpable   COR RRR  Lung clear     A/P: 46yMale POD# 1s/p ACDF C6-C7                         14.0   18.71 )-----------( 275      ( 29 Oct 2021 08:14 )             43.6   10-29    137  |  104  |  18  ----------------------------<  165<H>  4.4   |  22  |  0.81    Ca    9.2      29 Oct 2021 08:14

## 2021-10-30 NOTE — DISCHARGE NOTE NURSING/CASE MANAGEMENT/SOCIAL WORK - PATIENT PORTAL LINK FT
You can access the FollowMyHealth Patient Portal offered by [facility] by registering at the following website: [email or web address]. By joining [facility]’s FollowMyHealth portal, you will also be able to view your health information using other applications (apps) compatible with our system.

## 2021-10-30 NOTE — PROGRESS NOTE ADULT - ASSESSMENT
POD nu 1  s/p C spine fusion  OOB  PT  VTE: scd  Diet:  Pain controlled
POD nu 1  s/p C spine fusion  OOB  PT  VTE: scd  Diet:  Pain controlled  WBC 20.4-- no signs of infection

## 2021-10-31 PROCEDURE — 36415 COLL VENOUS BLD VENIPUNCTURE: CPT

## 2021-10-31 PROCEDURE — 85025 COMPLETE CBC W/AUTO DIFF WBC: CPT

## 2021-10-31 PROCEDURE — 73560 X-RAY EXAM OF KNEE 1 OR 2: CPT

## 2021-10-31 PROCEDURE — C1713: CPT

## 2021-10-31 PROCEDURE — 97530 THERAPEUTIC ACTIVITIES: CPT

## 2021-10-31 PROCEDURE — C1889: CPT

## 2021-10-31 PROCEDURE — 72040 X-RAY EXAM NECK SPINE 2-3 VW: CPT

## 2021-10-31 PROCEDURE — 76000 FLUOROSCOPY <1 HR PHYS/QHP: CPT

## 2021-10-31 PROCEDURE — 80048 BASIC METABOLIC PNL TOTAL CA: CPT

## 2021-10-31 PROCEDURE — 97161 PT EVAL LOW COMPLEX 20 MIN: CPT

## 2025-01-24 NOTE — OCCUPATIONAL THERAPY INITIAL EVALUATION ADULT - LEVEL OF INDEPENDENCE: SIT/SUPINE, REHAB EVAL
Detail Level: Generalized Detail Level: Simple Detail Level: Detailed minimum assist (75% patients effort)